# Patient Record
Sex: FEMALE | Race: BLACK OR AFRICAN AMERICAN | NOT HISPANIC OR LATINO | Employment: PART TIME | URBAN - METROPOLITAN AREA
[De-identification: names, ages, dates, MRNs, and addresses within clinical notes are randomized per-mention and may not be internally consistent; named-entity substitution may affect disease eponyms.]

---

## 2017-05-18 PROBLEM — I10 ESSENTIAL HYPERTENSION, BENIGN: Status: ACTIVE | Noted: 2017-05-18

## 2017-05-18 PROBLEM — R51.9 HEADACHE: Status: ACTIVE | Noted: 2017-05-18

## 2017-05-18 PROBLEM — K59.01 SLOW TRANSIT CONSTIPATION: Status: ACTIVE | Noted: 2017-05-18

## 2017-05-18 PROBLEM — G47.00 INSOMNIA: Status: ACTIVE | Noted: 2017-05-18

## 2017-05-22 PROBLEM — E66.01 MORBID OBESITY WITH BMI OF 40.0-44.9, ADULT: Status: ACTIVE | Noted: 2017-05-22

## 2017-06-09 PROBLEM — E78.00 HYPERCHOLESTEROLEMIA: Status: ACTIVE | Noted: 2017-06-09

## 2017-06-09 PROBLEM — R73.03 PREDIABETES: Status: ACTIVE | Noted: 2017-06-09

## 2017-08-16 ENCOUNTER — HOSPITAL ENCOUNTER (EMERGENCY)
Facility: HOSPITAL | Age: 27
Discharge: HOME | End: 2017-08-16
Attending: EMERGENCY MEDICINE
Payer: MEDICAID

## 2017-08-16 VITALS
HEART RATE: 90 BPM | SYSTOLIC BLOOD PRESSURE: 118 MMHG | BODY MASS INDEX: 39.24 KG/M2 | OXYGEN SATURATION: 98 % | TEMPERATURE: 99 F | DIASTOLIC BLOOD PRESSURE: 57 MMHG | WEIGHT: 250 LBS | RESPIRATION RATE: 20 BRPM | HEIGHT: 67 IN

## 2017-08-16 DIAGNOSIS — R50.9 FEVER, UNSPECIFIED FEVER CAUSE: ICD-10-CM

## 2017-08-16 DIAGNOSIS — R51.9 ACUTE INTRACTABLE HEADACHE, UNSPECIFIED HEADACHE TYPE: Primary | ICD-10-CM

## 2017-08-16 DIAGNOSIS — R07.9 CHEST PAIN: ICD-10-CM

## 2017-08-16 LAB
ALBUMIN SERPL BCP-MCNC: 3.6 G/DL
ALP SERPL-CCNC: 62 U/L
ALT SERPL W/O P-5'-P-CCNC: 16 U/L
ANION GAP SERPL CALC-SCNC: 8 MMOL/L
AST SERPL-CCNC: 23 U/L
BASOPHILS # BLD AUTO: 0.02 K/UL
BASOPHILS NFR BLD: 0.2 %
BILIRUB SERPL-MCNC: 0.4 MG/DL
BUN SERPL-MCNC: 8 MG/DL
CALCIUM SERPL-MCNC: 9.7 MG/DL
CHLORIDE SERPL-SCNC: 103 MMOL/L
CLARITY CSF: CLEAR
CO2 SERPL-SCNC: 25 MMOL/L
COLOR CSF: COLORLESS
CREAT SERPL-MCNC: 1.1 MG/DL
DIFFERENTIAL METHOD: ABNORMAL
EOSINOPHIL # BLD AUTO: 0.1 K/UL
EOSINOPHIL NFR BLD: 1.1 %
ERYTHROCYTE [DISTWIDTH] IN BLOOD BY AUTOMATED COUNT: 15 %
EST. GFR  (AFRICAN AMERICAN): >60 ML/MIN/1.73 M^2
EST. GFR  (NON AFRICAN AMERICAN): >60 ML/MIN/1.73 M^2
GLUCOSE CSF-MCNC: 56 MG/DL
GLUCOSE SERPL-MCNC: 87 MG/DL
HCT VFR BLD AUTO: 38.4 %
HGB BLD-MCNC: 12.3 G/DL
INR PPP: 1
LYMPHOCYTES # BLD AUTO: 1.1 K/UL
LYMPHOCYTES NFR BLD: 13.7 %
MCH RBC QN AUTO: 26.1 PG
MCHC RBC AUTO-ENTMCNC: 32 G/DL
MCV RBC AUTO: 82 FL
MONOCYTES # BLD AUTO: 0.6 K/UL
MONOCYTES NFR BLD: 7.4 %
NEUTROPHILS # BLD AUTO: 6.4 K/UL
NEUTROPHILS NFR BLD: 77.4 %
PLATELET # BLD AUTO: 247 K/UL
PMV BLD AUTO: 11.5 FL
POTASSIUM SERPL-SCNC: 4.1 MMOL/L
PROT CSF-MCNC: 20 MG/DL
PROT SERPL-MCNC: 8.3 G/DL
PROTHROMBIN TIME: 10.3 SEC
RBC # BLD AUTO: 4.71 M/UL
RBC # CSF: 0 /CU MM
SODIUM SERPL-SCNC: 136 MMOL/L
SPECIMEN VOL CSF: 1 ML
TROPONIN I SERPL DL<=0.01 NG/ML-MCNC: <0.006 NG/ML
WBC # BLD AUTO: 8.23 K/UL
WBC # CSF: 3 /CU MM

## 2017-08-16 PROCEDURE — 62270 DX LMBR SPI PNXR: CPT

## 2017-08-16 PROCEDURE — 93005 ELECTROCARDIOGRAM TRACING: CPT

## 2017-08-16 PROCEDURE — 84157 ASSAY OF PROTEIN OTHER: CPT

## 2017-08-16 PROCEDURE — 89051 BODY FLUID CELL COUNT: CPT

## 2017-08-16 PROCEDURE — 99284 EMERGENCY DEPT VISIT MOD MDM: CPT | Mod: 25

## 2017-08-16 PROCEDURE — 63600175 PHARM REV CODE 636 W HCPCS: Performed by: EMERGENCY MEDICINE

## 2017-08-16 PROCEDURE — 85610 PROTHROMBIN TIME: CPT

## 2017-08-16 PROCEDURE — 96374 THER/PROPH/DIAG INJ IV PUSH: CPT

## 2017-08-16 PROCEDURE — 82945 GLUCOSE OTHER FLUID: CPT

## 2017-08-16 PROCEDURE — 96375 TX/PRO/DX INJ NEW DRUG ADDON: CPT

## 2017-08-16 PROCEDURE — 99000 SPECIMEN HANDLING OFFICE-LAB: CPT

## 2017-08-16 PROCEDURE — 87205 SMEAR GRAM STAIN: CPT

## 2017-08-16 PROCEDURE — 87070 CULTURE OTHR SPECIMN AEROBIC: CPT

## 2017-08-16 PROCEDURE — 85025 COMPLETE CBC W/AUTO DIFF WBC: CPT

## 2017-08-16 PROCEDURE — 84484 ASSAY OF TROPONIN QUANT: CPT

## 2017-08-16 PROCEDURE — 87040 BLOOD CULTURE FOR BACTERIA: CPT

## 2017-08-16 PROCEDURE — 80053 COMPREHEN METABOLIC PANEL: CPT

## 2017-08-16 RX ORDER — PROCHLORPERAZINE MALEATE 10 MG
10 TABLET ORAL EVERY 6 HOURS PRN
Qty: 15 TABLET | Refills: 0 | Status: SHIPPED | OUTPATIENT
Start: 2017-08-16

## 2017-08-16 RX ORDER — KETOROLAC TROMETHAMINE 10 MG/1
10 TABLET, FILM COATED ORAL EVERY 8 HOURS PRN
Qty: 10 TABLET | Refills: 0 | Status: SHIPPED | OUTPATIENT
Start: 2017-08-16 | End: 2022-01-28

## 2017-08-16 RX ORDER — DOCUSATE SODIUM 100 MG/1
100 CAPSULE, LIQUID FILLED ORAL 2 TIMES DAILY
COMMUNITY

## 2017-08-16 RX ORDER — KETOROLAC TROMETHAMINE 30 MG/ML
15 INJECTION, SOLUTION INTRAMUSCULAR; INTRAVENOUS
Status: COMPLETED | OUTPATIENT
Start: 2017-08-16 | End: 2017-08-16

## 2017-08-16 RX ORDER — PROCHLORPERAZINE EDISYLATE 5 MG/ML
5 INJECTION INTRAMUSCULAR; INTRAVENOUS
Status: COMPLETED | OUTPATIENT
Start: 2017-08-16 | End: 2017-08-16

## 2017-08-16 RX ADMIN — PROCHLORPERAZINE EDISYLATE 5 MG: 5 INJECTION INTRAMUSCULAR; INTRAVENOUS at 01:08

## 2017-08-16 RX ADMIN — KETOROLAC TROMETHAMINE 15 MG: 30 INJECTION, SOLUTION INTRAMUSCULAR at 01:08

## 2017-08-16 NOTE — ED TRIAGE NOTES
"Pt comes in complaining of constant midsternal chest pain radiating "all across chest" and headache since 1700 yesterday. Pt denies cough or fever. Pt complains of nausea.  "

## 2017-08-16 NOTE — ED NOTES
Received report from Caroline PARK.Pt came from room 12.Sitting in chair in [facility] .No acute distress noted.Awaitng .

## 2017-08-16 NOTE — ED NOTES
Pt laying in bed in no apparent distress. Pt states pain decreased to 7/10. Pt reupdated on plan of care. Call light within reach. Will continue to monitor.

## 2017-08-16 NOTE — ED PROVIDER NOTES
"Encounter Date: 8/16/2017    SCRIBE #1 NOTE: I, Radha Pop, am scribing for, and in the presence of,  Bert Rossi MD. I have scribed the following portions of the note - Other sections scribed: ROS, HPI.       History     Chief Complaint   Patient presents with    Weakness     states she almost passed out twice this morning.. feeling weak and hot    Chest Pain     midsternal chest pain     CC: Headache; Chest Pain    HPI: Patient is a 27 y.o. F with a past medical history of Anemia; GERD; Hypertension; and Migraine headache who presents to the ED for evaluation of a constant, "burning," generalized headache with associated neck pain and stiffness and intermittent "stabbing" midsternal chest pain which began acutely yesterday evening. She feels that the onset of her chest pain is related to the intensity of her headache. Patient reports symptoms have been worsening today, and she now notes fever, photophobia, and nausea. She adds that she experienced 2 episodes of near-syncope prior to arrival. Patient reports a history of migraine headaches, but states that her headache today is inconsistent with typical presentation. Patient denies cough, sore throat, rhinorrhea, abdominal pain, emesis, diarrhea, rash, dysuria, and/or sick contact. Surgical history notable for partial hysterectomy in February 2017.      The history is provided by the patient. No  was used.     Review of patient's allergies indicates:   Allergen Reactions    Corn containing products Itching    Mushroom combination no.1 Hives    Peanut Hives    Wheat containing prod Hives    Demerol [meperidine] Nausea And Vomiting and Rash     Past Medical History:   Diagnosis Date    Anemia     Constipation     GERD (gastroesophageal reflux disease)     Hypertension     Migraine headache     Snoring     SOB (shortness of breath)     [facility]     Past Surgical History:   Procedure Laterality Date    HYSTERECTOMY " "     Partial    LYMPH NODE BIOPSY      TONSILLECTOMY      TUBAL LIGATION       Family History   Problem Relation Age of Onset    Hypertension Mother     No Known Problems Father     Diabetes Maternal Grandmother      Social History   Substance Use Topics    Smoking status: Never Smoker    Smokeless tobacco: Never Used    Alcohol use Yes     Review of Systems   Constitutional: Positive for fever.   HENT: Negative for rhinorrhea and sore throat.    Eyes: Positive for photophobia.   Respiratory: Negative for cough.    Cardiovascular: Positive for chest pain (midsternal).   Gastrointestinal: Positive for nausea. Negative for abdominal pain, diarrhea and vomiting.   Genitourinary: Negative for dysuria.   Musculoskeletal: Positive for neck pain and neck stiffness.   Skin: Negative for rash.   Neurological: Positive for headaches (generalized; "burning").        (+) Near-syncope       Physical Exam     Initial Vitals [08/16/17 0932]   BP Pulse Resp Temp SpO2   (!) 171/81 108 20 (S) (!) 100.8 °F (38.2 °C) 99 %      MAP       111         Physical Exam    Nursing note and vitals reviewed.  Constitutional: She appears well-developed and well-nourished.  Non-toxic appearance. She does not appear ill.   HENT:   Head: Normocephalic and atraumatic.   Eyes: EOM are normal. Right conjunctiva is injected. Left conjunctiva is injected.   Tearful.  Eyes slightly injected.  No real photophobia.   Neck: Neck supple.   Cardiovascular: Regular rhythm. Tachycardia present.    Pulmonary/Chest: Effort normal and breath sounds normal. No respiratory distress.   Abdominal: Soft. Normal appearance and bowel sounds are normal. She exhibits no distension. There is no tenderness. There is no CVA tenderness.   Musculoskeletal: Normal range of motion.   No erythema or swelling to calves, bilaterally.    Neurological: She is alert. No cranial nerve deficit.   Skin: Skin is warm and dry.   Psychiatric: She has a normal mood and affect. "         ED Course   Procedures  Labs Reviewed   CBC W/ AUTO DIFFERENTIAL - Abnormal; Notable for the following:        Result Value    MCH 26.1 (*)     RDW 15.0 (*)     Gran% 77.4 (*)     Lymph% 13.7 (*)     All other components within normal limits   CULTURE, CSF  (INCLUDES STAIN)   CULTURE, BLOOD   CULTURE, BLOOD   PROTIME-INR   GLUCOSE, CSF   PROTEIN, CSF   CSF CELL COUNT WITH DIFFERENTIAL   FREEZE AND HOLD -    TROPONIN I   COMPREHENSIVE METABOLIC PANEL     EKG Readings: (Independently Interpreted)   Rhythm: Sinus Tachycardia. Heart Rate: 106. ST Segments: Normal ST Segments. Axis: Normal. Other Impression: Possible mild biatrial enlargement.          Medical Decision Making:   Patient is complaining mostly of headache, neck pain, was found have a fever here in emergency room.  LP shows no signs of leukocytosis in the CSF to suggest meningitis.  Patient symptoms felt better after Toradol and Compazine.  She states she does get headaches every now and then but has no formal history of migraines.  Regards to her chest pain her EKG looks benign except for mild biatrial enlargement.  She has no hypoxia or tachypnea to suggest pulmonary embolism.  She feels better after Toradol and Compazine.  I don't believe the patient is acute coronary syndrome, pulmonary embolism, pneumonia pneumothorax.  She has no signs of lower extremity edema to suggest DVT.  She states mostly her pain is headache for the reason she came in today. She likely has an early viral illness I feel that she stable for discharge.            Scribe Attestation:   Scribe #1: I performed the above scribed service and the documentation accurately describes the services I performed. I attest to the accuracy of the note.    Attending Attestation:           Physician Attestation for Scribe:  Physician Attestation Statement for Scribe #1: I, Bert Rossi MD, reviewed documentation, as scribed by Radha Pop in my presence, and it is both accurate  and complete.                 ED Course     Clinical Impression:   The primary encounter diagnosis was Acute intractable headache, unspecified headache type. Diagnoses of Chest pain and Fever, unspecified fever cause were also pertinent to this visit.                           Bert Rossi MD  08/16/17 5416

## 2017-08-19 LAB
CMV SPEC QL SHELL VIAL CULT: NO GROWTH
GRAM STN SPEC: NORMAL

## 2017-08-21 LAB
BACTERIA BLD CULT: NORMAL
BACTERIA BLD CULT: NORMAL

## 2019-10-13 ENCOUNTER — HOSPITAL ENCOUNTER (EMERGENCY)
Facility: HOSPITAL | Age: 29
Discharge: HOME | End: 2019-10-13
Attending: EMERGENCY MEDICINE
Payer: MEDICAID

## 2019-10-13 VITALS
RESPIRATION RATE: 18 BRPM | HEART RATE: 80 BPM | OXYGEN SATURATION: 99 % | DIASTOLIC BLOOD PRESSURE: 86 MMHG | TEMPERATURE: 99 F | SYSTOLIC BLOOD PRESSURE: 138 MMHG | BODY MASS INDEX: 34.53 KG/M2 | WEIGHT: 220 LBS | HEIGHT: 67 IN

## 2019-10-13 DIAGNOSIS — R07.89 CHEST WALL PAIN: Primary | ICD-10-CM

## 2019-10-13 DIAGNOSIS — R07.9 CHEST PAIN: ICD-10-CM

## 2019-10-13 PROCEDURE — 93010 ELECTROCARDIOGRAM REPORT: CPT | Mod: ,,, | Performed by: INTERNAL MEDICINE

## 2019-10-13 PROCEDURE — 96372 THER/PROPH/DIAG INJ SC/IM: CPT

## 2019-10-13 PROCEDURE — 25000003 PHARM REV CODE 250: Performed by: EMERGENCY MEDICINE

## 2019-10-13 PROCEDURE — 93010 EKG 12-LEAD: ICD-10-PCS | Mod: ,,, | Performed by: INTERNAL MEDICINE

## 2019-10-13 PROCEDURE — 63600175 PHARM REV CODE 636 W HCPCS: Performed by: EMERGENCY MEDICINE

## 2019-10-13 PROCEDURE — 99284 EMERGENCY DEPT VISIT MOD MDM: CPT | Mod: 25

## 2019-10-13 PROCEDURE — 93005 ELECTROCARDIOGRAM TRACING: CPT

## 2019-10-13 RX ORDER — BACLOFEN 10 MG/1
10 TABLET ORAL 3 TIMES DAILY
Qty: 90 TABLET | Refills: 0 | Status: SHIPPED | OUTPATIENT
Start: 2019-10-13 | End: 2020-10-12

## 2019-10-13 RX ORDER — BACLOFEN 10 MG/1
10 TABLET ORAL ONCE
Status: COMPLETED | OUTPATIENT
Start: 2019-10-13 | End: 2019-10-13

## 2019-10-13 RX ORDER — SUCRALFATE 1 G/1
1 TABLET ORAL 2 TIMES DAILY
Qty: 90 TABLET | Refills: 0 | Status: SHIPPED | OUTPATIENT
Start: 2019-10-13

## 2019-10-13 RX ORDER — KETOROLAC TROMETHAMINE 30 MG/ML
30 INJECTION, SOLUTION INTRAMUSCULAR; INTRAVENOUS
Status: COMPLETED | OUTPATIENT
Start: 2019-10-13 | End: 2019-10-13

## 2019-10-13 RX ORDER — FAMOTIDINE 20 MG/1
20 TABLET, FILM COATED ORAL
Status: COMPLETED | OUTPATIENT
Start: 2019-10-13 | End: 2019-10-13

## 2019-10-13 RX ORDER — BACLOFEN 10 MG/1
10 TABLET ORAL ONCE
Status: DISCONTINUED | OUTPATIENT
Start: 2019-10-13 | End: 2019-10-13

## 2019-10-13 RX ORDER — DEXAMETHASONE SODIUM PHOSPHATE 4 MG/ML
12 INJECTION, SOLUTION INTRA-ARTICULAR; INTRALESIONAL; INTRAMUSCULAR; INTRAVENOUS; SOFT TISSUE
Status: COMPLETED | OUTPATIENT
Start: 2019-10-13 | End: 2019-10-13

## 2019-10-13 RX ADMIN — LIDOCAINE HYDROCHLORIDE: 20 SOLUTION ORAL; TOPICAL at 11:10

## 2019-10-13 RX ADMIN — KETOROLAC TROMETHAMINE 30 MG: 30 INJECTION, SOLUTION INTRAMUSCULAR at 11:10

## 2019-10-13 RX ADMIN — FAMOTIDINE 20 MG: 20 TABLET ORAL at 11:10

## 2019-10-13 RX ADMIN — DEXAMETHASONE SODIUM PHOSPHATE 12 MG: 4 INJECTION, SOLUTION INTRA-ARTICULAR; INTRALESIONAL; INTRAMUSCULAR; INTRAVENOUS; SOFT TISSUE at 11:10

## 2019-10-13 RX ADMIN — BACLOFEN 10 MG: 10 TABLET ORAL at 11:10

## 2019-10-13 NOTE — DISCHARGE INSTRUCTIONS
Thank you for coming to our Emergency Department today. It is important to remember that some problems are difficult to diagnose and may not be found during your first visit. Be sure to follow up with your primary care doctor and review any labs/imaging that was performed with them. If you do not have a primary care doctor, you may contact the one listed on your discharge paperwork or you may also call the [facility] Appointment Desk at [phone] to schedule an appointment with one.     All medications may potentially have side effects and it is impossible to predict which medications may give you side effects. If you feel that you are having a negative effect of any medication you should immediately stop taking them and seek medical attention.    Return to the ER with any questions/concerns, new/concerning symptoms, worsening or failure to improve. Do not drive or make any important decisions for 24 hours if you have received any pain medications, sedatives or mood altering drugs during your ER visit.

## 2019-10-13 NOTE — ED PROVIDER NOTES
Encounter Date: 10/13/2019       History     Chief Complaint   Patient presents with    Chest Pain     pt to ER with c/o left sided chest pain radiating down arm with associated dizzness.      29 y.o. female Past Medical History:  No date: Anemia  No date: Constipation  No date: GERD (gastroesophageal reflux disease)  No date: Hypertension  No date: Migraine headache  No date: Snoring  No date: SOB (shortness of breath)      Comment:  [facility]     Notes that her reflux was bad yesterday. Had a lot of burning in her chest/sour taste in mouth and made her cough a lot. Today awoke with chest wall pain and tenderness from the coughing. Denies f/c, n/v, diarrhea/dysuria or other symptoms, denies sob/pleuritic chest pain.        Review of patient's allergies indicates:   Allergen Reactions    Corn containing products Itching    Mushroom combination no.1 Hives    Peanut Hives    Wheat containing prod Hives    Demerol [meperidine] Nausea And Vomiting and Rash     Past Medical History:   Diagnosis Date    Anemia     Constipation     GERD (gastroesophageal reflux disease)     Hypertension     Migraine headache     Snoring     SOB (shortness of breath)     [facility]     Past Surgical History:   Procedure Laterality Date    HYSTERECTOMY      Partial    LYMPH NODE BIOPSY      TONSILLECTOMY      TUBAL LIGATION       Family History   Problem Relation Age of Onset    Hypertension Mother     No Known Problems Father     Diabetes Maternal Grandmother      Social History     Tobacco Use    Smoking status: Never Smoker    Smokeless tobacco: Never Used   Substance Use Topics    Alcohol use: Yes    Drug use: No     Review of Systems   Constitutional: Negative for fever.   HENT: Negative for sore throat.    Respiratory: Negative for shortness of breath.    Cardiovascular: Negative for chest pain.   Gastrointestinal: Negative for nausea.   Genitourinary: Negative for dysuria.    Musculoskeletal: Negative for back pain.   Skin: Negative for rash.   Neurological: Negative for weakness.   Hematological: Does not bruise/bleed easily.   All other systems reviewed and are negative.      Physical Exam     Initial Vitals [10/13/19 1126]   BP Pulse Resp Temp SpO2   (!) 140/86 84 18 98.3 °F (36.8 °C) 98 %      MAP       --         Physical Exam    Nursing note and vitals reviewed.  Constitutional: She appears well-developed and well-nourished.   HENT:   Head: Normocephalic and atraumatic.   Eyes: Conjunctivae and EOM are normal. Pupils are equal, round, and reactive to light.   Neck: Normal range of motion.   Cardiovascular: Normal rate and regular rhythm.   Pulmonary/Chest: Breath sounds normal. No respiratory distress. She has no wheezes. She has no rales.   Abdominal: She exhibits no distension.   Musculoskeletal: Normal range of motion.   Neurological: She is alert. No cranial nerve deficit. GCS score is 15. GCS eye subscore is 4. GCS verbal subscore is 5. GCS motor subscore is 6.   Skin: Skin is warm and dry.   Psychiatric: She has a normal mood and affect. Thought content normal.       +costochondral chest wall ttp, completely reproduces symptoms, +L trapezius ttp  ED Course   Procedures  Labs Reviewed - No data to display  EKG Readings: (Independently Interpreted)   Hr 97, sinus, nl axis/intervals, no south/twi, non acute, no stemi.       Imaging Results    None                       1.gerd: gi cocktail  2. Costochondritis/muscle spasms: toradol/decadron    Will discharge on carafate/baclofen      symptoms have improved with intervention  Clinical Impression:       ICD-10-CM ICD-9-CM   1. Chest wall pain R07.89 786.52   2. Chest pain R07.9 786.50                                Oswaldo Contreras MD  10/13/19 3655

## 2020-01-21 ENCOUNTER — HOSPITAL ENCOUNTER (EMERGENCY)
Facility: HOSPITAL | Age: 30
Discharge: HOME | End: 2020-01-21
Attending: EMERGENCY MEDICINE
Payer: MEDICAID

## 2020-01-21 VITALS
DIASTOLIC BLOOD PRESSURE: 79 MMHG | OXYGEN SATURATION: 100 % | HEART RATE: 84 BPM | WEIGHT: 260 LBS | TEMPERATURE: 98 F | SYSTOLIC BLOOD PRESSURE: 136 MMHG | RESPIRATION RATE: 18 BRPM | HEIGHT: 67 IN | BODY MASS INDEX: 40.81 KG/M2

## 2020-01-21 DIAGNOSIS — S61.217A LACERATION OF LEFT LITTLE FINGER WITHOUT FOREIGN BODY WITHOUT DAMAGE TO NAIL, INITIAL ENCOUNTER: Primary | ICD-10-CM

## 2020-01-21 PROCEDURE — 90715 TDAP VACCINE 7 YRS/> IM: CPT | Performed by: NURSE PRACTITIONER

## 2020-01-21 PROCEDURE — 90471 IMMUNIZATION ADMIN: CPT | Performed by: NURSE PRACTITIONER

## 2020-01-21 PROCEDURE — 63600175 PHARM REV CODE 636 W HCPCS: Performed by: NURSE PRACTITIONER

## 2020-01-21 PROCEDURE — 12001 RPR S/N/AX/GEN/TRNK 2.5CM/<: CPT

## 2020-01-21 PROCEDURE — 99283 EMERGENCY DEPT VISIT LOW MDM: CPT | Mod: 25

## 2020-01-21 PROCEDURE — 25000003 PHARM REV CODE 250: Performed by: NURSE PRACTITIONER

## 2020-01-21 RX ORDER — UBIDECARENONE 75 MG
500 CAPSULE ORAL DAILY
COMMUNITY

## 2020-01-21 RX ORDER — LIDOCAINE HYDROCHLORIDE 10 MG/ML
10 INJECTION INFILTRATION; PERINEURAL
Status: COMPLETED | OUTPATIENT
Start: 2020-01-21 | End: 2020-01-21

## 2020-01-21 RX ORDER — NICOTINE POLACRILEX 2 MG
GUM BUCCAL
COMMUNITY

## 2020-01-21 RX ADMIN — NEOMYCIN-BACITRACIN-POLYMYXIN OINT 1 EACH: OINTMENT at 05:01

## 2020-01-21 RX ADMIN — CLOSTRIDIUM TETANI TOXOID ANTIGEN (FORMALDEHYDE INACTIVATED), CORYNEBACTERIUM DIPHTHERIAE TOXOID ANTIGEN (FORMALDEHYDE INACTIVATED), BORDETELLA PERTUSSIS TOXOID ANTIGEN (GLUTARALDEHYDE INACTIVATED), BORDETELLA PERTUSSIS FILAMENTOUS HEMAGGLUTININ ANTIGEN (FORMALDEHYDE INACTIVATED), BORDETELLA PERTUSSIS PERTACTIN ANTIGEN, AND BORDETELLA PERTUSSIS FIMBRIAE 2/3 ANTIGEN 0.5 ML: 5; 2; 2.5; 5; 3; 5 INJECTION, SUSPENSION INTRAMUSCULAR at 05:01

## 2020-01-21 RX ADMIN — LIDOCAINE HYDROCHLORIDE 10 ML: 10 INJECTION, SOLUTION INFILTRATION; PERINEURAL at 05:01

## 2020-01-21 NOTE — DISCHARGE INSTRUCTIONS
Please keep your wound clean and dry.  Wash gently with soap and water and apply antibiotic ointment (bacitracin, neosporin, etc.) over the wound after washing. Please watch for signs of infection including: increased\spreading redness, swelling, pus-like discharge, or a fever greater than 100.4F. If you experience any of these, please contact your Primary Care Doctor or Return to the Emergency Department for a wound check.     Please follow up with your Primary Care Doctor in 7 days for wound recheck and suture removal.  You may return to the Emergency Department if you are unable to see your Primary Care Doctor.  Please return to the ER for any new or worsening symptoms.    Tylenol/ibuprofen for pain.

## 2020-01-21 NOTE — ED PROVIDER NOTES
Encounter Date: 1/21/2020    SCRIBE #1 NOTE: I, Juani Wiley, am scribing for, and in the presence of,  Yunior Almonte DNP. I have scribed the following portions of the note - Other sections scribed: HPI and ROS.       History     Chief Complaint   Patient presents with    Laceration     pt cut finger on knife while washing dishes at 1000. No bleeding noted at present. Laceration to left 5th finger      CC: Laceration    HPI: This 29 y.o female presents to the ED for evaluation of a laceration to the left fifth finger PTA. Pt states that she cut her finger while cleaning a knife when she was washing dishes. The pt rates the pain as a moderate 7/10 and has been experiencing a burning sensation. Pt states symptoms exacerbated with movement. Pt is not up to date with tetanus vaccination. No other associated symptoms. No treatment attempted.       The history is provided by the patient.     Review of patient's allergies indicates:   Allergen Reactions    Toradol [ketorolac]     Corn containing products Itching    Mushroom combination no.1 Hives    Peanut Hives    Wheat containing prod Hives    Demerol [meperidine] Nausea And Vomiting and Rash     Past Medical History:   Diagnosis Date    Anemia     Constipation     GERD (gastroesophageal reflux disease)     Hypertension     Migraine headache     Snoring     SOB (shortness of breath)     [facility]     Past Surgical History:   Procedure Laterality Date    HYSTERECTOMY      Partial    LYMPH NODE BIOPSY      TONSILLECTOMY      TUBAL LIGATION       Family History   Problem Relation Age of Onset    Hypertension Mother     No Known Problems Father     Diabetes Maternal Grandmother      Social History     Tobacco Use    Smoking status: Never Smoker    Smokeless tobacco: Never Used   Substance Use Topics    Alcohol use: Yes     Comment: social    Drug use: No     Review of Systems   Constitutional: Negative for chills, fatigue and fever.    HENT: Negative for congestion, ear discharge, ear pain, postnasal drip, rhinorrhea, sinus pressure, sneezing, sore throat and voice change.    Eyes: Negative for discharge and itching.   Respiratory: Negative for cough, shortness of breath and wheezing.    Cardiovascular: Negative for chest pain, palpitations and leg swelling.   Gastrointestinal: Negative for abdominal pain, constipation, diarrhea, nausea and vomiting.   Endocrine: Negative for polydipsia, polyphagia and polyuria.   Genitourinary: Negative for dysuria, frequency, hematuria, urgency, vaginal bleeding, vaginal discharge and vaginal pain.   Musculoskeletal: Negative for arthralgias, back pain and myalgias.   Skin: Positive for wound. Negative for rash.        (+) laceration to the right fifth finger   Neurological: Negative for dizziness, seizures, syncope, weakness and numbness.   Hematological: Negative for adenopathy. Does not bruise/bleed easily.   Psychiatric/Behavioral: Negative for self-injury and suicidal ideas. The patient is not nervous/anxious.        Physical Exam     Initial Vitals [01/21/20 1650]   BP Pulse Resp Temp SpO2   138/87 95 18 98.7 °F (37.1 °C) 100 %      MAP       --         Physical Exam    Nursing note and vitals reviewed.  Constitutional: She appears well-developed and well-nourished.   HENT:   Head: Normocephalic and atraumatic.   Right Ear: External ear normal.   Left Ear: External ear normal.   Nose: Nose normal.   Eyes: Conjunctivae and EOM are normal. Pupils are equal, round, and reactive to light. Right eye exhibits no discharge. Left eye exhibits no discharge.   Neck: Normal range of motion.   Abdominal: She exhibits no distension.   Musculoskeletal: Normal range of motion.        Left hand: She exhibits normal capillary refill. Decreased sensation is not present in the ulnar distribution, is not present in the medial redistribution and is not present in the radial distribution. Left little finger: Injuries:  laceration. Motor /Testing: Little Finger: 5/5.        Hands:  Neurological: She is alert and oriented to person, place, and time.   Skin: Skin is dry. Capillary refill takes less than 2 seconds.         ED Course   Lac Repair  Date/Time: 1/21/2020 11:59 PM  Performed by: Yunior Almonte DNP  Authorized by: Bert Martini MD   Consent Done: Not Needed  Foreign bodies: no foreign bodies  Tendon involvement: none  Nerve involvement: none  Vascular damage: no  Anesthesia: local infiltration    Anesthesia:  Local Anesthetic: lidocaine 1% without epinephrine  Anesthetic total: 1 mL  Patient sedated: no  Preparation: Patient was prepped and draped in the usual sterile fashion.  Irrigation solution: saline  Irrigation method: jet lavage  Amount of cleaning: standard  Debridement: none  Degree of undermining: none  Skin closure: 5-0 nylon  Number of sutures: 3  Technique: simple  Approximation: close  Approximation difficulty: simple  Patient tolerance: Patient tolerated the procedure well with no immediate complications        Labs Reviewed - No data to display       Imaging Results    None                APC / Resident Notes:   This is an evaluation of a 29 y.o. female that presents to the Emergency Department for a Laceration to left little finger. Tetanus was not up to date but adacel was administered.    The wound was closed per the procedure note.     My overall impression is Laceration. I considered, but at this time, do not suspect cellulitis, compartment syndrome, underlying fracture, tendon injury, or suspect any retained foreign body at this time.     Laceration/Wound Care/Suture Removal instructions given. The diagnosis, treatment plan, instructions for follow-up and reevaluation with her PCP or Return to ED in 7 days for suture removal as well as ED return precautions were discussed and understanding was verbalized. All questions or concerns have been addressed. I did not feel that splinting was necessary  as the affected area was adjacent to a movement surface but should not have excessive stresses placed on it by movement. I opted to suture because of the clinical appearance of the wound.  It was clean, but skin was spreadable. I did not feel that taping or glue was appropriate. Pt is to use tylenol/ibuprofen for pain control.       Scribe Attestation:   Scribe #1: I performed the above scribed service and the documentation accurately describes the services I performed. I attest to the accuracy of the note.                          Clinical Impression:       ICD-10-CM ICD-9-CM   1. Laceration of left little finger without foreign body without damage to nail, initial encounter S61.217A 883.0         Disposition:   Disposition: Discharged  Condition: Stable    KENAN OTERO DNP ACNP-BC FNP-C , personally performed the services described in this documentation. All medical record entries made by the scribe were at my direction and in my presence. I have reviewed the chart and agree that the record reflects my personal performance and is accurate and complete.                 Yunior Almonte DNP  01/22/20 0002       Yunior Almonte DNP  01/22/20 0002

## 2020-01-28 ENCOUNTER — HOSPITAL ENCOUNTER (EMERGENCY)
Facility: HOSPITAL | Age: 30
Discharge: HOME | End: 2020-01-28
Attending: EMERGENCY MEDICINE
Payer: MEDICAID

## 2020-01-28 VITALS
TEMPERATURE: 99 F | BODY MASS INDEX: 40.81 KG/M2 | RESPIRATION RATE: 18 BRPM | OXYGEN SATURATION: 99 % | SYSTOLIC BLOOD PRESSURE: 140 MMHG | DIASTOLIC BLOOD PRESSURE: 73 MMHG | HEIGHT: 67 IN | HEART RATE: 88 BPM | WEIGHT: 260 LBS

## 2020-01-28 DIAGNOSIS — Z48.02 VISIT FOR SUTURE REMOVAL: Primary | ICD-10-CM

## 2020-01-28 PROCEDURE — 99283 EMERGENCY DEPT VISIT LOW MDM: CPT

## 2020-01-28 PROCEDURE — 25000003 PHARM REV CODE 250: Performed by: PHYSICIAN ASSISTANT

## 2020-01-28 RX ADMIN — NEOMYCIN-BACITRACIN-POLYMYXIN OINT 1 EACH: OINTMENT at 08:01

## 2020-01-28 NOTE — ED PROVIDER NOTES
Encounter Date: 1/28/2020    SCRIBE #1 NOTE: I, Serjio Gonsalez, am scribing for, and in the presence of,  Chan Cuadra PA-C. I have scribed the following portions of the note - Other sections scribed: HPI, ROS, PE.       History     Chief Complaint   Patient presents with    Suture / Staple Removal     pt here to get sutures removed. Wound intact. Sutures intact. No drainage noted      This is a 29 y.o. female who presents to the Emergency Department with a cc of suture removal to the fifth digit of her left hand.  Pt states that her pain is tolerable (1/10).  Pt has no pertinent complaints regarding her laceration.  Denies any symptoms of infection or other complications.  Pt also denies fever, chills, numbness, or weakness.  Pt states that her three sutures are still fully intact.    The history is provided by the patient.     Review of patient's allergies indicates:   Allergen Reactions    Toradol [ketorolac]     Corn containing products Itching    Mushroom combination no.1 Hives    Peanut Hives    Wheat containing prod Hives    Demerol [meperidine] Nausea And Vomiting and Rash     Past Medical History:   Diagnosis Date    Anemia     Constipation     GERD (gastroesophageal reflux disease)     Hypertension     Migraine headache     Snoring     SOB (shortness of breath)     [facility]     Past Surgical History:   Procedure Laterality Date    HYSTERECTOMY      Partial    LYMPH NODE BIOPSY      TONSILLECTOMY      TUBAL LIGATION       Family History   Problem Relation Age of Onset    Hypertension Mother     No Known Problems Father     Diabetes Maternal Grandmother      Social History     Tobacco Use    Smoking status: Never Smoker    Smokeless tobacco: Never Used   Substance Use Topics    Alcohol use: Yes     Comment: social    Drug use: No     Review of Systems   Constitutional: Negative for chills, diaphoresis and fever.   HENT: Negative for congestion and sore throat.    Eyes:  Negative for pain.   Respiratory: Negative for cough and shortness of breath.    Cardiovascular: Negative for chest pain.   Gastrointestinal: Negative for abdominal pain, diarrhea, nausea and vomiting.   Genitourinary: Negative for dysuria.   Musculoskeletal: Negative for myalgias.   Skin:        (+) suture to the fifth digit of left hand   Neurological: Negative for weakness, numbness and headaches.       Physical Exam     Initial Vitals [01/28/20 0806]   BP Pulse Resp Temp SpO2   (!) 140/73 88 18 98.6 °F (37 °C) 99 %      MAP       --         Physical Exam    Nursing note and vitals reviewed.  Constitutional: She appears well-developed and well-nourished. She is not diaphoretic. No distress.   HENT:   Head: Normocephalic and atraumatic.   Nose: Nose normal.   Mouth/Throat: Oropharynx is clear and moist. No oropharyngeal exudate.   Eyes: EOM are normal. Pupils are equal, round, and reactive to light.   Neck: Normal range of motion.   Cardiovascular: Normal rate, regular rhythm, normal heart sounds and intact distal pulses. Exam reveals no gallop and no friction rub.    No murmur heard.  Pulmonary/Chest: Breath sounds normal. No respiratory distress.   Abdominal: Soft. There is no tenderness.   Musculoskeletal: Normal range of motion. She exhibits no edema or tenderness.        Left hand: Left little finger: Lt Little Finger - Findings: (+) Three sutures intact; appears to be healing appropriately with no definite signs of infection.   Neurological: She is alert and oriented to person, place, and time. She has normal strength. No cranial nerve deficit or sensory deficit. GCS score is 15. GCS eye subscore is 4. GCS verbal subscore is 5. GCS motor subscore is 6.   ANNE-MARIE with NGND's   Skin: Skin is warm and dry. Capillary refill takes less than 2 seconds. No rash and no abscess noted. No erythema. No pallor.   Psychiatric: She has a normal mood and affect. Her behavior is normal. Judgment and thought content normal.          ED Course   Suture Removal  Date/Time: 1/28/2020 3:47 PM  Performed by: Chan Cuadra PA-C  Authorized by: Oswaldo Contreras MD   Body area: upper extremity  Location details: left hand  Description of findings: Three Sutures Removed    Wound Appearance: clean, well healed, normal color, nontender and no drainage  Post-removal: no dressing applied  Complications: No  Specimens: No  Implants: No  Patient tolerance: Patient tolerated the procedure well with no immediate complications        Labs Reviewed - No data to display       Imaging Results    None          Medical Decision Making:   ED Management:  Sutures removed and patient tolerated procedure without complication. D/c home with PCP f/u as needed. Return instructions given. Pt verbalizes understanding and is agreeable with plan.            Scribe Attestation:   Scribe #1: I performed the above scribed service and the documentation accurately describes the services I performed. I attest to the accuracy of the note.                          Clinical Impression:       ICD-10-CM ICD-9-CM   1. Visit for suture removal Z48.02 V58.32         Disposition:   Disposition: Discharged  Condition: Stable    Scribe attestation: I, Chan Cuadra, personally performed the services described in this documentation. All medical record entries made by the scribe were at my direction and in my presence.  I have reviewed the chart and agree that the record reflects my personal performance and is accurate and complete.                 Chan Cuadra PA-C  01/29/20 9405

## 2020-01-29 ENCOUNTER — PATIENT OUTREACH (OUTPATIENT)
Dept: EMERGENCY MEDICINE | Facility: HOSPITAL | Age: 30
End: 2020-01-29

## 2020-03-15 ENCOUNTER — HOSPITAL ENCOUNTER (EMERGENCY)
Facility: HOSPITAL | Age: 30
Discharge: HOME | End: 2020-03-15
Attending: EMERGENCY MEDICINE
Payer: MEDICAID

## 2020-03-15 VITALS
OXYGEN SATURATION: 100 % | DIASTOLIC BLOOD PRESSURE: 84 MMHG | BODY MASS INDEX: 40.81 KG/M2 | TEMPERATURE: 98 F | WEIGHT: 260 LBS | HEIGHT: 67 IN | HEART RATE: 81 BPM | RESPIRATION RATE: 16 BRPM | SYSTOLIC BLOOD PRESSURE: 124 MMHG

## 2020-03-15 DIAGNOSIS — M79.604 RIGHT LEG PAIN: Primary | ICD-10-CM

## 2020-03-15 PROCEDURE — 99284 EMERGENCY DEPT VISIT MOD MDM: CPT | Mod: 25

## 2020-03-15 RX ORDER — METHOCARBAMOL 750 MG/1
1500 TABLET, FILM COATED ORAL 3 TIMES DAILY PRN
Qty: 20 TABLET | Refills: 0 | Status: SHIPPED | OUTPATIENT
Start: 2020-03-15 | End: 2020-03-20

## 2020-03-16 NOTE — ED TRIAGE NOTES
Pt reports R leg pain and swelling x1 day. No hx of similar, still able to ambulate without issue.

## 2020-03-16 NOTE — ED PROVIDER NOTES
Encounter Date: 3/15/2020       History     Chief Complaint   Patient presents with    Leg Pain     right leg pain since last pm w/ swelling.  was seen at  sent here for further eval.     31yo F with pmh anemia, easy bruising, GERD, HTN, migraines, with chief complaint atraumatic R leg pain and swelling x 2 days. No trauma. No back pain. No radiculopathy or paresthesia. No leg weakness. Pt admits to a soreness to the posterior aspect of entire R leg, worse with palpation, with ambulation,even with standing. No claudication. Pt states leg was swollen this AM. No hx DVT.  Denies any rash.  Denies any open wounds.  Went to urgent care, sent to the ED. No CP or SOB.        Review of patient's allergies indicates:   Allergen Reactions    Toradol [ketorolac]     Corn containing products Itching    Lisinopril Hives    Mushroom combination no.1 Hives    Peanut Hives    Wheat containing prod Hives    Demerol [meperidine] Nausea And Vomiting and Rash     Past Medical History:   Diagnosis Date    Anemia     Constipation     GERD (gastroesophageal reflux disease)     Hypertension     Migraine headache     Snoring     SOB (shortness of breath)     [facility]     Past Surgical History:   Procedure Laterality Date    HYSTERECTOMY      Partial    LYMPH NODE BIOPSY      TONSILLECTOMY      TUBAL LIGATION       Family History   Problem Relation Age of Onset    Hypertension Mother     No Known Problems Father     Diabetes Maternal Grandmother      Social History     Tobacco Use    Smoking status: Never Smoker    Smokeless tobacco: Never Used   Substance Use Topics    Alcohol use: Yes     Comment: social    Drug use: No     Review of Systems   Constitutional: Negative for chills and fever.   Eyes: Negative for discharge and redness.   Respiratory: Negative for cough, chest tightness and shortness of breath.    Cardiovascular: Positive for leg swelling. Negative for chest pain.   Musculoskeletal:  Positive for gait problem. Negative for back pain, joint swelling, myalgias, neck pain and neck stiffness.        Leg pain   Skin: Negative for rash.   Neurological: Negative for dizziness and light-headedness.       Physical Exam     Initial Vitals [03/15/20 1802]   BP Pulse Resp Temp SpO2   (!) 153/80 86 16 99.1 °F (37.3 °C) 100 %      MAP       --         Physical Exam    Nursing note and vitals reviewed.  Constitutional: She appears well-developed and well-nourished. She is not diaphoretic. No distress.   Well-appearing and nontoxic.  Resting comfortably on exam table.  Ambulates with mildly antalgic gait, favoring left leg.   Eyes: EOM are normal.   Neck: Neck supple.   Cardiovascular: Intact distal pulses.   1+ DP bilaterally    No pretibial edema.  No unilateral leg swelling.   Pulmonary/Chest: No respiratory distress.   Musculoskeletal:   Small tender area of ecchymosis to the right medial knee.   There is pain to entire posterior aspect of the right leg with dorsiflexion of the right ankle.   There is tenderness to the entire right posterior leg including the calf, posterior thigh, and posterior gluteal region.  No midline spinal tenderness.   Neurological: She is alert and oriented to person, place, and time.   Skin: Skin is warm and dry. No erythema.   Psychiatric: She has a normal mood and affect. Thought content normal.         ED Course   Procedures  Labs Reviewed - No data to display       Imaging Results          US Lower Extremity Veins Right (Final result)  Result time 03/15/20 20:59:46    Final result by Gerardo Oliveros MD (03/15/20 20:59:46)                 Impression:      No evidence of deep venous thrombosis in the right lower extremity.      Electronically signed by: Gerardo Oliveros  Date:    03/15/2020  Time:    20:59             Narrative:    EXAMINATION:  US LOWER EXTREMITY VEINS RIGHT    CLINICAL HISTORY:  Other specified soft tissue disorders    TECHNIQUE:  Duplex and color flow Doppler  evaluation and graded compression of the right lower extremity veins was performed.    COMPARISON:  None    FINDINGS:  Right thigh veins: The common femoral, femoral, popliteal, upper greater saphenous, and deep femoral veins are patent and free of thrombus. The veins are normally compressible and have normal phasic flow and augmentation response.    Right calf veins: The visualized calf veins are patent.    Contralateral CFV: The contralateral (left) common femoral vein is patent and free of thrombus.    Miscellaneous: None                                 Medical Decision Making:   Differential Diagnosis:   DVT, muscle strain, fracture, contusion, arthritis  ED Management:   No appreciable swelling.  Ultrasound without evidence of DVT.  No shortness of breath or chest pain. PERC negative. No evidence to suggest PE. Good, symmetric distal pulses. No claudication symptoms. There is reproducible tenderness.       I will treat as muscle strain.                                 Clinical Impression:       ICD-10-CM ICD-9-CM   1. Right leg pain M79.604 729.5         Disposition:   Disposition: Discharged  Condition: Stable     ED Disposition Condition    Discharge Stable        ED Prescriptions     Medication Sig Dispense Start Date End Date Auth. Provider    methocarbamoL (ROBAXIN) 750 MG Tab Take 2 tablets (1,500 mg total) by mouth 3 (three) times daily as needed (muscle stiffness/soreness). 20 tablet 3/15/2020 3/20/2020 Jorge Cm PA-C        Follow-up Information     Follow up With Specialties Details Why Contact Info    Eric Kaplan MD Family Medicine Schedule an appointment as soon as possible for a visit  For reevaluation [address] [facility] [address]  [phone]      [facility] Emergency Medicine  As needed, If symptoms worsen [address]  [facility] [address] [phone]                                     Jorge Cm PA-C  03/16/20 5330

## 2020-03-16 NOTE — DISCHARGE INSTRUCTIONS
Robaxin for muscle stiffness /soreness. Be aware, this medication is sedating.  Do not mix with alcohol or any other sedating medications.  Do not drive or operate machinery when taking this medication.  Ibuprofen or Tylenol as needed for discomfort.    Please follow-up with primary for re-evaluation.  Return to this ED if pain worsens despite treatment, if unable to walk or bear weight, if you begin with chest pain or shortness of breath,   If you experience leg swelling, if any other problems occur.

## 2020-05-21 ENCOUNTER — HOSPITAL ENCOUNTER (EMERGENCY)
Facility: HOSPITAL | Age: 30
Discharge: HOME | End: 2020-05-21
Attending: EMERGENCY MEDICINE
Payer: MEDICAID

## 2020-05-21 VITALS
WEIGHT: 262 LBS | HEIGHT: 67 IN | TEMPERATURE: 99 F | SYSTOLIC BLOOD PRESSURE: 131 MMHG | HEART RATE: 98 BPM | RESPIRATION RATE: 16 BRPM | OXYGEN SATURATION: 97 % | DIASTOLIC BLOOD PRESSURE: 76 MMHG | BODY MASS INDEX: 41.12 KG/M2

## 2020-05-21 DIAGNOSIS — L29.9 ITCHING: Primary | ICD-10-CM

## 2020-05-21 LAB
B-HCG UR QL: NEGATIVE
CTP QC/QA: YES

## 2020-05-21 PROCEDURE — 81025 URINE PREGNANCY TEST: CPT | Performed by: EMERGENCY MEDICINE

## 2020-05-21 PROCEDURE — 99284 EMERGENCY DEPT VISIT MOD MDM: CPT

## 2020-05-21 RX ORDER — ONDANSETRON 4 MG/1
4 TABLET, FILM COATED ORAL EVERY 6 HOURS PRN
Qty: 12 TABLET | Refills: 0 | Status: SHIPPED | OUTPATIENT
Start: 2020-05-21

## 2020-05-21 RX ORDER — HYDROXYZINE PAMOATE 25 MG/1
25 CAPSULE ORAL
Status: DISCONTINUED | OUTPATIENT
Start: 2020-05-21 | End: 2020-05-21

## 2020-05-21 RX ORDER — HYDROXYZINE HYDROCHLORIDE 25 MG/1
25 TABLET, FILM COATED ORAL 3 TIMES DAILY PRN
Qty: 12 TABLET | Refills: 0 | Status: SHIPPED | OUTPATIENT
Start: 2020-05-21

## 2020-05-22 NOTE — DISCHARGE INSTRUCTIONS
Please return to the Emergency Department for any new or worsening symptoms including: abdominal pain, fever, chest pain, shortness of breath, loss of consciousness, dizziness, weakness, or any other concerns.     Please follow up with your Primary Care Provider within in the week. If you do not have one, you may contact the one listed on your discharge paperwork or you may also call the [facility] Appointment Desk at [phone] to schedule an appointment with one.

## 2020-05-22 NOTE — ED PROVIDER NOTES
"  Encounter Date: 5/21/2020    SCRIBE #1 NOTE: I, Joseph Menchaca, am scribing for, and in the presence of,  Kristy Boyd NP. I have scribed the following portions of the note - Other sections scribed: HPI/ROS/PE.       History     Chief Complaint   Patient presents with    Itching     Patient c/o generalized itching all over body x 25 mins.  Patient reports that she ate a bowl of redbeans that had "maggots" in them.  Denies n/v, diarrhea, or abd pain.     This 30 y.o. female with a medical history of HTN presents to the ED for an emergent evaluation of generalized body itching. Pt reports she cooked ham hocks around 20:00 this evening that she bought from [employer]. Pt reports when she ate about half of the bowl, she noticed that there were "cooked maggots" in the bowl. She called the on-call nurse this evening and was told to take 50mg Benadryl, which she did, and was referred to the ED for a further evaluation. No relief with Benadryl. Otherwise, pt denies fever, chills, n/v/d, rash, trouble swallowing, SOB, cough, and any other associated symptoms.    The history is provided by the patient. No  was used.     Review of patient's allergies indicates:   Allergen Reactions    Toradol [ketorolac]     Corn containing products Itching    Gluten protein     Lisinopril Hives    Mushroom combination no.1 Hives    Peanut Hives    Wheat containing prod Hives    Demerol [meperidine] Nausea And Vomiting and Rash     Past Medical History:   Diagnosis Date    Anemia     Constipation     GERD (gastroesophageal reflux disease)     Hypertension     Migraine headache     Snoring     SOB (shortness of breath)     [facility]     Past Surgical History:   Procedure Laterality Date    HYSTERECTOMY      Partial    LYMPH NODE BIOPSY      TONSILLECTOMY      TUBAL LIGATION       Family History   Problem Relation Age of Onset    Hypertension Mother     No Known Problems Father     " Diabetes Maternal Grandmother      Social History     Tobacco Use    Smoking status: Never Smoker    Smokeless tobacco: Never Used   Substance Use Topics    Alcohol use: Yes     Comment: social    Drug use: No     Review of Systems   Constitutional: Negative for chills and fever.   HENT: Negative for congestion, rhinorrhea, sore throat and trouble swallowing.    Eyes: Negative for visual disturbance.   Respiratory: Negative for cough and shortness of breath.    Cardiovascular: Negative for chest pain.   Gastrointestinal: Negative for abdominal pain, diarrhea, nausea and vomiting.   Genitourinary: Negative for difficulty urinating.   Musculoskeletal: Negative for neck stiffness.   Skin: Negative for wound.        (+) generalized itching    Neurological: Negative for headaches.       Physical Exam     Initial Vitals [05/21/20 2201]   BP Pulse Resp Temp SpO2   131/76 98 16 99.3 °F (37.4 °C) 97 %      MAP       --         Physical Exam    Nursing note and vitals reviewed.  Constitutional: She appears well-developed and well-nourished. She is not diaphoretic. No distress.   HENT:   Head: Normocephalic and atraumatic.   Mouth/Throat: Oropharynx is clear and moist.   Eyes: EOM are normal. Pupils are equal, round, and reactive to light.   Neck: Normal range of motion. Neck supple.   Cardiovascular: Normal rate, regular rhythm, normal heart sounds and intact distal pulses.   Pulmonary/Chest: Breath sounds normal. No respiratory distress.   Abdominal: Soft. Normal appearance and bowel sounds are normal. There is no hepatosplenomegaly or splenomegaly. There is no tenderness. There is no rigidity, no rebound, no guarding, no CVA tenderness, no tenderness at McBurney's point and negative Hopkins's sign. No hernia.   Musculoskeletal: Normal range of motion. She exhibits no edema or tenderness.   Neurological: She is alert and oriented to person, place, and time. She has normal strength.   Skin: Skin is warm and dry. No rash  noted.   Psychiatric: She has a normal mood and affect.         ED Course   Procedures  Labs Reviewed   POCT URINE PREGNANCY          Imaging Results    None          Medical Decision Making:   ED Management:  30-year-old female presenting for generalized body itching after eating a bowl of red beans with possible maggots.  Took Benadryl prior to arrival with no improvement.  On exam, she is well appearing.  Calm and cooperative. AAOx4.  Denies drug use.  Doubt psychosis.  No signs of systemic allergic reaction.  No hives.  No rash.  Abdomen is soft and nontender.  Doubt acute intra-abdominal process.  Will give hydroxyzine p.r.n. itch.  Zofran p.r.n. nausea.  Follow up with PCP.    Based on my clinical evaluation, I do not appreciate any immediate, emergent, or life threatening condition or etiology that warrants additional workup today.  I feel the patient can be discharged with close follow-up care.            Scribe Attestation:   Scribe #1: I performed the above scribed service and the documentation accurately describes the services I performed. I attest to the accuracy of the note.                          Clinical Impression:       ICD-10-CM ICD-9-CM   1. Itching L29.9 698.9         Disposition:   Disposition: Discharged  Condition: Stable   Scribe Attestation: I, NUBIA Boyd, personally performed the services described in this documentation. All medical record entries made by the scribe were at my direction and in my presence. I have reviewed the chart and agree that the record reflects my personal performance and is accurate and complete.   ED Disposition Condition    Discharge Stable        ED Prescriptions     Medication Sig Dispense Start Date End Date Auth. Provider    hydroxyzine HCL (ATARAX) 25 MG tablet Take 1 tablet (25 mg total) by mouth 3 (three) times daily as needed for Itching. 12 tablet 5/21/2020  Kristy Boyd, NP    ondansetron (ZOFRAN) 4 MG tablet Take 1 tablet (4 mg total) by mouth  every 6 (six) hours as needed for Nausea. 12 tablet 5/21/2020  Kristy Boyd NP        Follow-up Information     Follow up With Specialties Details Why Contact Info    Eric Kaplan MD Family Medicine Schedule an appointment as soon as possible for a visit  For follow-up [address] [address]  [phone]      [facility] Emergency Medicine Go to  If symptoms worsen [address] [facility] [address] [phone]                                     Kristy Boyd NP  05/22/20 0216       Kristy Boyd NP  05/22/20 0217

## 2020-05-22 NOTE — ED NOTES
Pt received discharge instructions. Verbalizes understanding . Denies further medical concerns upon discharge. Vitals updated. No IV to remove. Pt in no distress

## 2020-05-22 NOTE — PROVIDER PROGRESS NOTES - EMERGENCY DEPT.
" Emergency Department TeleTRIAGE Encounter Note      CHIEF COMPLAINT    Chief Complaint   Patient presents with    Itching     Patient c/o generalized itching all over body x 25 mins.  Patient reports that she ate a bowl of redbeans that had "maggots" in them.  Denies n/v, diarrhea, or abd pain.       VITAL SIGNS   Initial Vitals [05/21/20 2201]   BP Pulse Resp Temp SpO2   131/76 98 16 99.3 °F (37.4 °C) 97 %      MAP       --            ALLERGIES    Review of patient's allergies indicates:   Allergen Reactions    Toradol [ketorolac]     Corn containing products Itching    Gluten protein     Lisinopril Hives    Mushroom combination no.1 Hives    Peanut Hives    Wheat containing prod Hives    Demerol [meperidine] Nausea And Vomiting and Rash       PROVIDER TRIAGE NOTE  Pt is a 30 y.o. female presenting to the ED for itching.  Pt states that she cooked some meat and beans.  Pt states that after she combined the food and ate a bowlful she noticed dead maggots in the food.  Since that time she has had increased itching.  Pt denies any nausea, vomiting or abdominal pain.       Patient seen and medically screened by Nurse practitioner in Teletriage process due to ED crowding.  Appropriate tests and/or medications ordered.  Care transferred to an alternate provider when patient was placed in an Exam Room from the [facility] for physical exam, additional orders, and disposition. 10:04 PM. Annia Nuñez NP        ORDERS  Labs Reviewed   POCT URINE PREGNANCY       ED Orders (720h ago, onward)    Start Ordered     Status Ordering Provider    05/21/20 2150 05/21/20 2149  POCT urine pregnancy  Once      Ordered DESHAWN WEINSTEIN            Virtual Visit Note: The provider triage portion of this emergency department evaluation and documentation was performed via [employer], a HIPAA-compliant telemedicine application, in concert with a tele-presenter in the room. A face to face patient evaluation with one of my colleagues " will occur once the patient is placed in an emergency department room.      DISCLAIMER: This note was prepared with [employer] voice recognition transcription software. Garbled syntax, mangled pronouns, and other bizarre constructions may be attributed to that software system.

## 2022-01-28 ENCOUNTER — OFFICE VISIT (OUTPATIENT)
Dept: NEUROSURGERY | Facility: CLINIC | Age: 32
End: 2022-01-28
Payer: MEDICAID

## 2022-01-28 VITALS
OXYGEN SATURATION: 100 % | HEIGHT: 67 IN | HEART RATE: 85 BPM | BODY MASS INDEX: 31.71 KG/M2 | WEIGHT: 202 LBS | DIASTOLIC BLOOD PRESSURE: 78 MMHG | SYSTOLIC BLOOD PRESSURE: 129 MMHG

## 2022-01-28 DIAGNOSIS — R51.9 CHRONIC NONINTRACTABLE HEADACHE, UNSPECIFIED HEADACHE TYPE: ICD-10-CM

## 2022-01-28 DIAGNOSIS — G89.29 CHRONIC NONINTRACTABLE HEADACHE, UNSPECIFIED HEADACHE TYPE: ICD-10-CM

## 2022-01-28 DIAGNOSIS — M54.2 NECK PAIN: ICD-10-CM

## 2022-01-28 DIAGNOSIS — R29.898 RIGHT ARM WEAKNESS: Primary | ICD-10-CM

## 2022-01-28 PROCEDURE — 3008F BODY MASS INDEX DOCD: CPT | Mod: CPTII,,, | Performed by: PHYSICIAN ASSISTANT

## 2022-01-28 PROCEDURE — 3074F PR MOST RECENT SYSTOLIC BLOOD PRESSURE < 130 MM HG: ICD-10-PCS | Mod: CPTII,,, | Performed by: PHYSICIAN ASSISTANT

## 2022-01-28 PROCEDURE — 99205 OFFICE O/P NEW HI 60 MIN: CPT | Mod: S$PBB,,, | Performed by: PHYSICIAN ASSISTANT

## 2022-01-28 PROCEDURE — 99215 OFFICE O/P EST HI 40 MIN: CPT | Mod: PBBFAC | Performed by: PHYSICIAN ASSISTANT

## 2022-01-28 PROCEDURE — 99205 PR OFFICE/OUTPT VISIT, NEW, LEVL V, 60-74 MIN: ICD-10-PCS | Mod: S$PBB,,, | Performed by: PHYSICIAN ASSISTANT

## 2022-01-28 PROCEDURE — 3008F PR BODY MASS INDEX (BMI) DOCUMENTED: ICD-10-PCS | Mod: CPTII,,, | Performed by: PHYSICIAN ASSISTANT

## 2022-01-28 PROCEDURE — 1159F MED LIST DOCD IN RCRD: CPT | Mod: CPTII,,, | Performed by: PHYSICIAN ASSISTANT

## 2022-01-28 PROCEDURE — 3078F DIAST BP <80 MM HG: CPT | Mod: CPTII,,, | Performed by: PHYSICIAN ASSISTANT

## 2022-01-28 PROCEDURE — 1160F PR REVIEW ALL MEDS BY PRESCRIBER/CLIN PHARMACIST DOCUMENTED: ICD-10-PCS | Mod: CPTII,,, | Performed by: PHYSICIAN ASSISTANT

## 2022-01-28 PROCEDURE — 3074F SYST BP LT 130 MM HG: CPT | Mod: CPTII,,, | Performed by: PHYSICIAN ASSISTANT

## 2022-01-28 PROCEDURE — 3078F PR MOST RECENT DIASTOLIC BLOOD PRESSURE < 80 MM HG: ICD-10-PCS | Mod: CPTII,,, | Performed by: PHYSICIAN ASSISTANT

## 2022-01-28 PROCEDURE — 99999 PR PBB SHADOW E&M-EST. PATIENT-LVL V: ICD-10-PCS | Mod: PBBFAC,,, | Performed by: PHYSICIAN ASSISTANT

## 2022-01-28 PROCEDURE — 1159F PR MEDICATION LIST DOCUMENTED IN MEDICAL RECORD: ICD-10-PCS | Mod: CPTII,,, | Performed by: PHYSICIAN ASSISTANT

## 2022-01-28 PROCEDURE — 1160F RVW MEDS BY RX/DR IN RCRD: CPT | Mod: CPTII,,, | Performed by: PHYSICIAN ASSISTANT

## 2022-01-28 PROCEDURE — 99999 PR PBB SHADOW E&M-EST. PATIENT-LVL V: CPT | Mod: PBBFAC,,, | Performed by: PHYSICIAN ASSISTANT

## 2022-01-28 NOTE — PROGRESS NOTES
[facility]  Neurosurgery    SUBJECTIVE:     History of Present Illness:  Agueda Gonsalez is a 31 y.o. female with below listed PMH who presents with 14 year history of headaches that radiate into her right arm. Headaches start on the right side of her head and wrap around the top of her head and then down her right shoulder and down her right arm. Headaches occur several times per day and last 3 minutes each. She takes OTC Excedrin and amitriptyline without much relief. Endorses numbness in her right hand, dropping items from her hands, and chronic constipation. Denies SA, urinary dysfunction, gait disturbance, and weakness. Denies LUE and BLE sxs. No prior imaging for her headaches, neck pain, or RUE sxs.     Denies falls but she does recall hitting her head twice last month. Both occurred in the same episode when she was trying to quickly get into her car. She hit her head on the side of her car. Vision was blurry for 1 second and then improved. Denies residual effects from this incident.     -PT: tried for her neck in the past.       (Not in a hospital admission)      Review of patient's allergies indicates:   Allergen Reactions    Toradol [ketorolac]     Corn containing products Itching    Gluten protein     Lisinopril Hives    Mushroom combination no.1 Hives    Peanut Hives    Wheat containing prod Hives    Demerol [meperidine] Nausea And Vomiting and Rash    Tramadol Hives, Itching and Nausea And Vomiting       Past Medical History:   Diagnosis Date    Anemia     Constipation     GERD (gastroesophageal reflux disease)     Hypertension     Migraine headache     Snoring     SOB (shortness of breath)     WHEN CL[facility] ST[facility]     Past Surgical History:   Procedure Laterality Date    HYSTERECTOMY      Partial    LYMPH NODE BIOPSY      TONSILLECTOMY      TUBAL LIGATION       Family History   Problem Relation Age of Onset    Hypertension Mother     No Known Problems Father      Diabetes Maternal Grandmother      Social History     Tobacco Use    Smoking status: Never Smoker    Smokeless tobacco: Never Used   Substance Use Topics    Alcohol use: Yes     Comment: social    Drug use: No        Review of Systems:  As noted in HPI    OBJECTIVE:     Vital Signs (Most Recent):  Pulse: 85 (01/28/22 1045)  BP: 129/78 (01/28/22 1045)  SpO2: 100 % (01/28/22 1045)    Physical Exam:  General: well developed, well nourished, no distress  Head: normocephalic, atraumatic  Neurologic: Alert and oriented. Thought content appropriate  GCS: Motor: 6/Verbal: 5/Eyes: 4 GCS Total: 15  Language: No aphasia  Speech: No dysarthria  Cranial nerves: face symmetric, tongue midline, CN II-XII grossly intact.   Eyes: pupils equal, round, reactive to light with accommodation, EOMI.   Pulmonary: normal respirations, not labored, no accessory muscles used  Sensory: intact to light touch throughout  Motor Strength: Moves all extremities spontaneously with good tone.      Strength  Deltoids Triceps Biceps Wrist Extension Wrist Flexion Hand    Upper: R 5/5 4/5 5/5 5/5 4/5 4/5    L 5/5 5/5 5/5 5/5 5/5 5/5     Iliopsoas Quadriceps Knee  Flexion Tibialis  anterior Gastro- cnemius EHL   Lower: R 5/5 5/5 5/5 5/5 5/5 5/5    L 5/5 5/5 5/5 5/5 5/5 5/5     Gonzales: absent  Clonus: absent  Skin: warm, dry and intact, no rashes  Gait: normal  Tandem Gait: No difficulty     Pronator drift: slight drift noted on right  Finger to nose: slight dysmetria on right       Diagnostic Results:  No imaging     ASSESSMENT/PLAN:     Agueda Gonsalez is a 31 y.o. female who presents with chronic headaches, neck pain, and right arm numbness/pain. On exam, she was found to have right arm weakness along with a positive drift on the right side. I will order brain MRI w/wo along with cervical MRI for evaluation of disc herniation and cranial masses.     Will call patient with results.       Please feel free to call with any further  questions      Janessa Blanco PA-C  [facility]  Department of Neurosurgery  [phone]    Disclaimer: This note was dictated by speech recognition. Minor errors in transcription may be present.  Please call with any questions.

## 2022-02-04 ENCOUNTER — HOSPITAL ENCOUNTER (OUTPATIENT)
Dept: RADIOLOGY | Facility: HOSPITAL | Age: 32
Discharge: HOME | End: 2022-02-04
Attending: PHYSICIAN ASSISTANT
Payer: MEDICAID

## 2022-02-04 DIAGNOSIS — R51.9 CHRONIC NONINTRACTABLE HEADACHE, UNSPECIFIED HEADACHE TYPE: ICD-10-CM

## 2022-02-04 DIAGNOSIS — G89.29 CHRONIC NONINTRACTABLE HEADACHE, UNSPECIFIED HEADACHE TYPE: ICD-10-CM

## 2022-02-04 DIAGNOSIS — R29.898 RIGHT ARM WEAKNESS: ICD-10-CM

## 2022-02-04 DIAGNOSIS — M54.2 NECK PAIN: ICD-10-CM

## 2022-02-04 PROCEDURE — 72141 MRI NECK SPINE W/O DYE: CPT | Mod: 26,,, | Performed by: RADIOLOGY

## 2022-02-04 PROCEDURE — 70551 MRI BRAIN WITHOUT CONTRAST: ICD-10-PCS | Mod: 26,,, | Performed by: RADIOLOGY

## 2022-02-04 PROCEDURE — 70551 MRI BRAIN STEM W/O DYE: CPT | Mod: 26,,, | Performed by: RADIOLOGY

## 2022-02-04 PROCEDURE — 70551 MRI BRAIN STEM W/O DYE: CPT | Mod: TC

## 2022-02-04 PROCEDURE — 72141 MRI NECK SPINE W/O DYE: CPT | Mod: TC

## 2022-02-04 PROCEDURE — 72141 MRI CERVICAL SPINE WITHOUT CONTRAST: ICD-10-PCS | Mod: 26,,, | Performed by: RADIOLOGY

## 2022-05-11 ENCOUNTER — HOSPITAL ENCOUNTER (OUTPATIENT)
Dept: RADIOLOGY | Facility: HOSPITAL | Age: 32
Discharge: HOME | End: 2022-05-11
Attending: PHYSICAL MEDICINE & REHABILITATION
Payer: MEDICAID

## 2022-05-11 DIAGNOSIS — M54.50 LOW BACK PAIN: ICD-10-CM

## 2022-05-11 DIAGNOSIS — M54.50 LUMBAGO: Primary | ICD-10-CM

## 2022-05-11 PROCEDURE — 72114 X-RAY EXAM L-S SPINE BENDING: CPT | Mod: 26,,, | Performed by: INTERNAL MEDICINE

## 2022-05-11 PROCEDURE — 72114 XR LUMBAR SPINE 5 VIEW WITH FLEX AND EXT: ICD-10-PCS | Mod: 26,,, | Performed by: INTERNAL MEDICINE

## 2022-05-11 PROCEDURE — 72114 X-RAY EXAM L-S SPINE BENDING: CPT | Mod: TC,FY

## 2025-05-07 ENCOUNTER — TELEPHONE (OUTPATIENT)
Dept: SLEEP MEDICINE | Facility: CLINIC | Age: 35
End: 2025-05-07
Payer: MEDICAID

## 2025-05-07 NOTE — TELEPHONE ENCOUNTER
Staff contact pt on behalf of message left with call center      ----- Message from Araceli sent at 5/7/2025  3:40 PM CDT -----  Type: Patient callWho called: [facility] (Shannan)  Does the patient know what this is regarding? Requesting a call back in regards to a status check on a referral ; sent over today 5/7 ; please advise  Would the patient rather a call back or response via [facility]? [facility]: [phone] information:Fax: [phone]

## 2025-06-10 ENCOUNTER — OFFICE VISIT (OUTPATIENT)
Dept: SLEEP MEDICINE | Facility: CLINIC | Age: 35
End: 2025-06-10
Payer: MEDICAID

## 2025-06-10 DIAGNOSIS — I10 ESSENTIAL HYPERTENSION, BENIGN: ICD-10-CM

## 2025-06-10 DIAGNOSIS — G47.33 OBSTRUCTIVE SLEEP APNEA: ICD-10-CM

## 2025-06-10 DIAGNOSIS — R06.81 WITNESSED EPISODE OF APNEA: ICD-10-CM

## 2025-06-10 DIAGNOSIS — G47.00 INSOMNIA, UNSPECIFIED TYPE: Primary | ICD-10-CM

## 2025-06-10 DIAGNOSIS — R06.83 SNORING: ICD-10-CM

## 2025-06-10 NOTE — PROGRESS NOTES
The patient location is: [place]  The chief complaint leading to consultation is: trouble with sleep    Visit type: audiovisual    30 minutes of total time spent on the encounter, which includes face to face time and non-face to face time preparing to see the patient (eg, review of tests), Obtaining and/or reviewing separately obtained history, Documenting clinical information in the electronic or other health record, Independently interpreting results (not separately reported) and communicating results to the patient/family/caregiver, or Care coordination (not separately reported).     Each patient to whom he or she provides medical services by telemedicine is:  (1) informed of the relationship between the physician and patient and the respective role of any other health care provider with respect to management of the patient; and (2) notified that he or she may decline to receive medical services by telemedicine and may withdraw from such care at any time.    Referred [facility]     NEW PATIENT VISIT    Agueda Gonsalez  is a pleasant 35 y.o. female who presents in the Spring of 2025 for sleep evaluation.    See assessment below for further history.    Past Medical History:   Diagnosis Date    Anemia     Anxiety disorder, unspecified     Arthritis     Constipation     Depression     Eczema     GERD (gastroesophageal reflux disease)     Hypertension     Migraine headache     KRYSTYNA on CPAP     PTSD (post-traumatic stress disorder)     Sciatica     Snoring     SOB (shortness of breath)     WHEN CLINBING STAIRS   Problem List[1]Current Medications[2]    There were no vitals filed for this visit.  Physical Exam:    GEN:   Well-appearing  Psych:  Appropriate affect, demonstrates insight  SKIN:  No rash on the face or bridge of the nose      LABS:   Lab Results   Component Value Date    CO2 31 04/16/2025         No echocardiogram results found for the past 12 months     Lab Results   Component Value Date     "FERRITIN 175 (H) 2017       RECORDS REVIEWED:  PSG 6.13.18 (289 lbs) - AHI 1.2    ASSESSMENT    Sig PMH:  PROBLEM DESCRIPTION/ Sx on Presentation  STATUS PLAN     History of  KRYSTYNA   Presentation:     Diagnosed [id number] with KRYSTYNA. Was on CPAP, but stopped after losing significant weight with bariatric surgery.  Weighed 289lbs after her last surgery; now is at goal weight of 173 lbs.    Last year started waking up gasping again, having a hard time falling asleep, feels tired during the day.    Was referred for home sleep study through [facility], but she was told it was "normal"  (Dec 2024-2025).    Wants to get retested in lab and retrial if sleep study is positive.    yes - snoring  yes - gasping arousals/coughing  yes - witnessed apneas, pauses in breathing    yes - night sweats    yes - AM headaches    yes - dry mouth/sore throat      new   -recent home sleep study was inconclusive; needs PSG for definitive results.    -[place] [facility] [place].    -PSG ordered.    -Pt is amenable to PAP trial if PSG is positive for KRYSTYNA     Daytime symptoms       Does not feel refreshed when waking up in the morning.     [place] Sleepiness Scale:  Sitting and readin  Watching TV:                              2  Passenger in a car x 1 hr:          2  Sitting quietly after lunch:           3  Lying down to rest in the afternoon when circumstances permit:           3  Sitting, inactive in public:            1  Sitting+ talking to someone:        1  Stopped in traffic:                        1  Total                                            15/24  ESS 15/24 on intake          new   -will reassess sleepiness after evaluation for KRYSTYNA     Insomnia     Frequent nocturnal awakenings.    yes - difficulty with sleep onset    yes - difficulty with sleep maintenance      SLEEP SCHEDULE   Duration    Wind- down    Envmnt    CBTi    Meds prior    Meds now Remeron    Bed Time 8-830PM "   Lights out    Latency 30 min   Arousals 5+   Back to sleep hours   Avg TST    Wake time 6AM   Caffeine    Naps Dozing frequently   Nocturia 4+   Work                  new   -will reassess after evaluation for sleep-disordered breathing       Nocturia     x 4 per sleep period    new          RTC:  will arrange RTC depending on results of sleep testing         PLAN      -recommend sleep testing   -PSG ordered  -discussed trial therapy if KRYSTYNA present and the patient is open to a trial of CPAP therapy  -discussed the etiology of obstructive sleep apnea as well as the potential ramifications of untreated sleep apnea, which could include daytime sleepiness, hypertension, heart disease and/or stroke. We discussed potential treatment options, which could include weight loss, body positioning, continuous positive airway pressure (CPAP), oral appliance, Inspire, or referral for surgical consideration.        Advised on plan of care. Answered all patient questions. Patient verbalized understanding and voiced agreement with plan of care.                         [1]   Patient Active Problem List  Diagnosis    Other general counseling and advice for contraceptive management    Anal fissure    Essential hypertension, benign    Acute intractable headache    Insomnia    Slow transit constipation    Morbid obesity with BMI of 40.0-44.9, adult    Hypercholesterolemia    Prediabetes   [2]   Current Outpatient Medications:     amLODIPine (NORVASC) 10 MG tablet, Take 1 tablet by mouth once daily., Disp: , Rfl:     atorvastatin (LIPITOR) 20 MG tablet, Take 20 mg by mouth once daily., Disp: , Rfl:     azelastine (ASTELIN) 137 mcg (0.1 %) nasal spray, 1 spray (137 mcg total) by Nasal route 2 (two) times daily., Disp: 30 mL, Rfl: 0    baclofen (LIORESAL) 10 MG tablet, Take 1 tablet (10 mg total) by mouth 3 (three) times daily., Disp: 90 tablet, Rfl: 0    biotin 1 mg Cap, Take by mouth., Disp: , Rfl:     cetirizine (ZYRTEC) 10 MG tablet,  Take 10 mg by mouth once daily., Disp: , Rfl:     clobetasol 0.05% (TEMOVATE) 0.05 % Oint, Apply 1 application  topically 2 (two) times daily., Disp: , Rfl:     cyanocobalamin 500 MCG tablet, Take 500 mcg by mouth once daily., Disp: , Rfl:     cyclobenzaprine (FLEXERIL) 10 MG tablet, Take 10 mg by mouth 2 (two) times daily as needed for Muscle spasms., Disp: , Rfl:     docusate sodium (COLACE) 100 MG capsule, Take 100 mg by mouth 2 (two) times daily., Disp: , Rfl:     dupilumab (DUPIXENT PEN) 200 mg/1.14 mL PnIj, Inject 200 mg into the skin every 14 (fourteen) days., Disp: , Rfl:     EPINEPHrine (EPIPEN) 0.3 mg/0.3 mL AtIn, Inject 0.3 mLs into the muscle once., Disp: , Rfl:     fluticasone (FLONASE) 50 mcg/actuation nasal spray, 1 spray by Each Nare route once daily., Disp: , Rfl:     hydrochlorothiazide (HYDRODIURIL) 25 MG tablet, Take 25 mg by mouth once daily., Disp: , Rfl:     hydroxyzine HCL (ATARAX) 25 MG tablet, Take 1 tablet (25 mg total) by mouth 3 (three) times daily as needed for Itching., Disp: 12 tablet, Rfl: 0    hydrOXYzine pamoate (VISTARIL) 25 MG Cap, Take 1 capsule by mouth every evening., Disp: , Rfl:     isosorbide dinitrate (ISORDIL) 10 MG tablet, Take 10 mg by mouth 3 (three) times daily., Disp: , Rfl:     LINZESS 290 mcg Cap capsule, Take 290 mcg by mouth before breakfast., Disp: , Rfl:     lurasidone (LATUDA) 60 mg Tab tablet, Take 60 mg by mouth once daily., Disp: , Rfl:     meloxicam (MOBIC) 15 MG tablet, Take 15 mg by mouth once daily., Disp: , Rfl:     metoprolol succinate (TOPROL-XL) 50 MG 24 hr tablet, Take 50 mg by mouth once daily., Disp: , Rfl:     mirtazapine (REMERON) 30 MG tablet, Take 30 mg by mouth every evening., Disp: , Rfl:     OXcarbazepine (TRILEPTAL) 300 MG Tab, Take 300 mg by mouth 2 (two) times daily., Disp: , Rfl:     pantoprazole (PROTONIX) 40 MG tablet, Take 40 mg by mouth 2 (two) times daily., Disp: , Rfl:     pimecrolimus (ELIDEL) 1 % cream, Apply 1 Application  topically 2 (two) times daily., Disp: , Rfl:     solifenacin (VESICARE) 10 MG tablet, Take 10 mg by mouth once daily., Disp: , Rfl:     triamcinolone acetonide 0.5% (KENALOG) 0.5 % Crea, Apply 1 Application topically 2 (two) times daily., Disp: , Rfl:

## 2025-06-19 ENCOUNTER — TELEPHONE (OUTPATIENT)
Dept: SLEEP MEDICINE | Facility: OTHER | Age: 35
End: 2025-06-19
Payer: MEDICAID

## 2025-07-28 ENCOUNTER — HOSPITAL ENCOUNTER (OUTPATIENT)
Dept: SLEEP MEDICINE | Facility: HOSPITAL | Age: 35
Discharge: HOME | End: 2025-07-28
Attending: NURSE PRACTITIONER
Payer: MEDICAID

## 2025-07-28 DIAGNOSIS — R06.83 SNORING: ICD-10-CM

## 2025-07-28 DIAGNOSIS — G47.00 INSOMNIA, UNSPECIFIED TYPE: ICD-10-CM

## 2025-07-28 DIAGNOSIS — I10 ESSENTIAL HYPERTENSION, BENIGN: ICD-10-CM

## 2025-07-28 DIAGNOSIS — R06.81 WITNESSED EPISODE OF APNEA: ICD-10-CM

## 2025-07-28 DIAGNOSIS — G47.33 OBSTRUCTIVE SLEEP APNEA: ICD-10-CM

## 2025-07-28 PROCEDURE — 95810 POLYSOM 6/> YRS 4/> PARAM: CPT

## 2025-07-29 NOTE — PROGRESS NOTES
A diagnostic study was performed on Agueda Norris. The entire procedure was explained, including Bio calibration procedure, patient was informed that there may be a need to enter the room during the night to fix lead or make adjustments to the equipment. Questions were answered prior to start of study. She was given instructions on how to call out for help including how to use the nurse call unit in the bathroom. Patient was given [employer] phone number to call if patient needed tech for anything, in case tech was out of tech room.  Patient education was performed. This includes the possible use of CPAP machine and different CPAP masks. She was given the after visit summary.   The lowest oxygen saturation observed during sleep was 90%   She did not meet criteria for a split night study due to insufficient amount of events during the 1st part of the study   The EKG appeared to be NSR  Supine REM sleep was obtained during the study. Soft to moderate snoring was observed

## 2025-08-20 DIAGNOSIS — G47.33 OBSTRUCTIVE SLEEP APNEA: Primary | ICD-10-CM

## 2025-08-20 DIAGNOSIS — G47.00 INSOMNIA, UNSPECIFIED TYPE: ICD-10-CM

## 2025-08-20 DIAGNOSIS — I10 ESSENTIAL HYPERTENSION, BENIGN: ICD-10-CM

## 2025-08-21 DIAGNOSIS — G47.33 OBSTRUCTIVE SLEEP APNEA: Primary | ICD-10-CM

## 2025-08-27 ENCOUNTER — TELEPHONE (OUTPATIENT)
Dept: SLEEP MEDICINE | Facility: CLINIC | Age: 35
End: 2025-08-27
Payer: MEDICAID